# Patient Record
Sex: FEMALE | Race: WHITE | NOT HISPANIC OR LATINO | Employment: FULL TIME | URBAN - METROPOLITAN AREA
[De-identification: names, ages, dates, MRNs, and addresses within clinical notes are randomized per-mention and may not be internally consistent; named-entity substitution may affect disease eponyms.]

---

## 2023-10-20 ENCOUNTER — LACTATION CONSULT (OUTPATIENT)
Dept: LACTATION | Facility: CLINIC | Age: 23
End: 2023-10-20
Payer: MEDICAID

## 2023-10-20 DIAGNOSIS — O92.70 LACTATION PROBLEM (HHS-HCC): Primary | ICD-10-CM

## 2023-10-20 PROCEDURE — 99211 OFF/OP EST MAY X REQ PHY/QHP: CPT | Performed by: EMERGENCY MEDICINE

## 2023-10-20 NOTE — PROGRESS NOTES
Lactation Counseling Note    Subjective:    Darlene Moser is a 22 y.o. patient referred for lactation counseling. She is here today due to Latch issues. She was referred by her  Midwifw-Catia Lockwood .     OB HISTORY:   Mode of Delivery: Normal vaginal route    Objective:    BREASTFEEDING ASSESSMENT:   Breast Assessment: Large, Pendulous, and Compressible  Nipple Assessment/Stage: erect with stimulation, creased after feeding, and sore Stage II - Abrasions, shallow crack or fissure, stripe  Areola: Normal  Feeding Position: c - hold, cross - cradle, skin to skin, both sides, switch nursing, mother needs assistance with latch/positioning, misalignment of baby's head, trunk, and hips, and baby's head too high over breast  Latch: moderate assistance is needed, instructed on deep latch, cries while latching, latch achieved, sucking and swallowing, upper lip turned in, lower lip turned in, chin moves in rhythmic motion, and frequent audible swallows  Suck/Feeding: sustained, coordinated suck/swallow/breathe, audible swallowing, audible swallowing with stimulation, and nipple shield used  Alternative Feeding Methods: nursing at the breast and paced bottle  Feeding and Cleaning instructions reviewed for: Paced bottle  Infant Feeding Method: Breast milk only  Infant Behavior: awake, crying, fussy, readiness to feed, feeding cues observed, rooting response, and suckles on and off, needs stimulation  Infant Information: Ankyloglossia  Good cupping of tongue  Fontanel: flat  Mucous Membranes: moist  Breast Pain: Pain scale 0-10 (10 most pain): 0  Nipple Pain: Pain scale 0-10 (10 most pain): 3  Weight: Reported pediatrician visit weight: 7 lb. 0 oz.   Date of pediatrician weight: 10-16-23  Weight before feedinlb 6.6 oz.   Weight after feedin lb 8.4 oz.  gm  Amount of breast milk transferred: 1.8 oz.  ml  Number of voids in the last 24 hours: 6-8  Number of stools in the last 24 hours: 3-5    PATIENT DISCUSSION SUMMARY:  "Mom, Dad and baby here for initial visit. Recommended by midwife. Infant has been spitting up, fussy, \"eats constantly\" , \"can't put baby down\". Mom states latch is uncomfortable and she is using 20mm nipple shield to latch. Infant weighed prior to feed at 7 lb 6.6 oz. Which is a gain of 6.6 oz since peds visit 10-16. Oral anatomy assessed. Infant has easily visble lip restriction. Tongue needed to be lifted up and back to reveal posterior frenulum. These findings were shown to parents and options discussed. Recommended speaking with pediatrician regarding difficulties and possible referral to ENT or peds dentist. Assisted with application of 24mm nipple shield. Shown cross cradle hold and nipple to nose approach. Mom states no one has shown her application of shield or positioning/latch on techniques. Infant latched readily. Assisted to flange both upper and lower lips. Good rhythmic sucks with audible swallows. Milk in shield when released. Infant transferred 0.8 oz per test weight. Assisted to opposite side with shield. Mom able to place shield with minimal assistance. [place] nursed in similar fashion. Transferred an additional 0.4 oz. Infant then latched again to both breast using switch nursing. Infant transferred an additional 0.6 oz. For a total of 1.8 oz. Infant does need much breast massage and stimulation to keep sucking effectively at breast. Discussed beginning to give 1-2 12ml. Syringes of expressed milk after each feed and to pump. Mom to keep log of feedings. Plan is to return Monday 10-23 for additional assistance and weight check. Parents plan to research info. on tongue and lip ties and options available.     LACTATION PROBLEMS AND STRATEGIES:  Fussy baby: Carry baby with his/her tummy down across forearm with hand supporting chest  Check the baby for tight clothing  Frequent burping during feedings may help if baby seem to have gas  Give the baby a gentle message  Increase holding and carrying of " "baby  Mom and baby may take a warm bath together   Normal fussy period, usually during late afternoon or early evening  Nurse baby at least 10-12 times per day. Watch for feeding cues. Do not wait until baby cries to feed  Nurse on demand. Try to avoid too rigid of a schedule  Provide lots of time skin to skin with baby  Reduce environmental stimuli (loud sounds, bright lights, etc.)  Swaddle in sleep sack  Turn dryer, vacuum, or shower on for \"white\" noise  Problems latching baby to breast: Baby should latch to areola (dark area) not just the nipple.  Baby's lips should be flanged outward.  Bring the baby up to the level of your breast by putting a pillow under the baby.  Do not use bottles or pacifiers until latching on well.  Dribble milk over the nipple or express milk so that baby can taste it  Encourage a deeper latch with the asymetrical latch- lining baby's nose opposite mother's nipple.  Encourage nipple to stand up prior to feeing by pumping, nipple rolling or by brief application of ice.  Gently tickle your baby's lip with your nipple to encourage your baby to open his/her mouth wide.  Hold baby so that your breast is positioned deep in the baby's mouth.  Hold your baby close, tummy to tummy.  If baby does not latch to breast, express breast milk.  If engorged, express some milk to soften breast.  If the baby is not latched on well, break seal and gently try again.  Keep baby skin to skin and watch for feeding cues.  Massage breast to start milk-ejection reflex.  Place nipple and at least 1 inch of areola into baby's mouth.  Place your hand behind the baby's neck and shoulder.  Do not force baby's head into breast.  Put baby in the football hold or clutch hold, supporting his/her neck and head in sniffing position to open his/her throat.  Shape breast into oval shape (sandwich hold)  for deep latch.  Try different feeding positions (cradle, football, side etc.).  Use a breast feeding pillow to bring baby " up to the level of the breast.  Use nipple shield and monitor baby's weight gain and output.  Use semi-reclined feeding position to allow baby to take an active role and trigger baby's inborn feeding behavior.  Use your hand to support your breast during feeding.  When your baby's mouth is wide open, quickly pull baby into your breast.  Your baby's chin should be pressed into your breast.  Spitting up: Count wet diapers and stools to ensure baby is getting enough to eat, Eliminate supplements, Gentle handling of baby. Keep baby upright after feedings, If projectile vomiting between 2 and 8 weeks of age, call your physician, If the baby gulps and chokes after letdown, take the baby off of the breast until initial flow subsides, In an otherwise healthy baby, spitting up is a laundry problem, Limit to 1 breast per feeding if spitting up is due to too much milk at each feeding, and Temporarily stop vitamin, iron, or fluoride supplements  PATIENT INSTRUCTION HANDOUTS GIVEN:      LACTATION EDUCATION:  Correct Positioning and Correct Latch On

## 2023-10-20 NOTE — PATIENT INSTRUCTIONS
"Mom, Dad and baby here for initial visit. Recommended by midwife. Infant has been spitting up, fussy, \"eats constantly\" , \"can't put baby down\". Mom states latch is uncomfortable and she is using 20mm nipple shield to latch. Infant weighed prior to feed at 7 lb 6.6 oz. Which is a gain of 6.6 oz since peds visit 10-16. Oral anatomy assessed. Infant has easily visble lip restriction. Tongue needed to be lifted up and back to reveal posterior frenulum. These findings were shown to parents and options discussed. Recommended speaking with pediatrician regarding difficulties and possible referral to ENT or peds dentist. Assisted with application of 24mm nipple shield. Shown cross cradle hold and nipple to nose approach. Mom states no one has shown her application of shield or positioning/latch on techniques. Infant latched readily. Assisted to flange both upper and lower lips. Good rhythmic sucks with audible swallows. Milk in shield when released. Infant transferred 0.8 oz per test weight. Assisted to opposite side with shield. Mom able to place shield with minimal assistance. [place] nursed in similar fashion. Transferred an additional 0.4 oz. Infant then latched again to both breast using switch nursing. Infant transferred an additional 0.6 oz. For a total of 1.8 oz. Infant does need much breast massage and stimulation to keep sucking effectively at breast. Discussed beginning to give 1-2 12ml. Syringes of expressed milk after each feed and to pump. Mom to keep log of feedings. Plan is to return Monday 10-23 for additional assistance and weight check. Parents plan to research info. on tongue and lip ties and options available.      "

## 2023-10-22 PROBLEM — F41.9 ANXIETY: Status: ACTIVE | Noted: 2022-05-20

## 2023-10-22 PROBLEM — F32.A DEPRESSION: Status: ACTIVE | Noted: 2022-05-16

## 2023-10-22 NOTE — PROGRESS NOTES
"Plan    Advised to call office for breast complaints, abnormal bleeding, mood changes, or other concerning symptoms.   Cleared to resume normal activity as desired  ***    {Assess/PlanSmartLinks:34845}    Lindseymarry Perez, APRN-CNM, APRN-CNP    Subjective   22 y.o. No obstetric history on file. presenting for postpartum follow-up     Delivery Date: ***  Gestational Age: 39.1 wga  Type of Delivery: NSVB, initial trial of homebirth, labor stalled; presented to [facility] at 8/100[facility]0          Concerns: ***    Pain: controlled  Lacerations: {Laceration:25412}  Lochia: ***  Sexual Intimacy: {YES (DEF)/NO:70357}  Contraceptive Method: {LZCONTRACEPTION:38767}  Feeding Method: {Breast feeding, yes/no:66493} {complications; breast feeding ob:[id number]::\"no breast or nursing problems\"}  Lactation Consult Needed?: {YES (DEF)/NO:56072}    Birth Trauma: {YES-DESCRIBE/NO:49216}  Bonding with Baby: well with {baby gender:19250}, [unfilled]   Mood:  , h/o anxiety/depression, on seroquel and zoloft    Last pap: ***    Physical Exam   "

## 2023-10-23 ENCOUNTER — LACTATION CONSULT (OUTPATIENT)
Dept: LACTATION | Facility: CLINIC | Age: 23
End: 2023-10-23
Payer: MEDICAID

## 2023-10-23 PROCEDURE — 99211 OFF/OP EST MAY X REQ PHY/QHP: CPT | Performed by: EMERGENCY MEDICINE

## 2023-10-23 NOTE — PATIENT INSTRUCTIONS
Mom here for follow up weight check.  Mom has been feeding with a shield with has improved pumping and pc bottle of ebm.  Baby is up 2.6 oz in 3 days.  Mom has appt with gia bañuelos on thurday.  Mom to stay with plan until revision . Follow up on friday

## 2023-10-23 NOTE — PROGRESS NOTES
Lactation Counseling Note    Subjective:    Darlene Moser is a 22 y.o. patient referred for lactation counseling. She is here today due to Latch issues. She was referred by her  self .     OB HISTORY:       Objective:    BREASTFEEDING ASSESSMENT:   Breast Assessment: Medium, Full, and Soft  Nipple Assessment/Stage: intact, bruised, and erect Stage I - Pain or irritation with no skin breakdown  Areola: Normal  Feeding Position: baby - led, cradle, laid back, both sides, switch nursing, and mother demonstrates good positioning  Latch: moderate assistance is needed and deep latch obtained  Suck/Feeding: unsustained, audible swallowing, and nipple shield used  Alternative Feeding Methods: feeding expressed breast milk and syringe feeding  Infant Information: Ankyloglossia  Receding chin  Poor occlusion of lips around areola  Breast Pain: Pain scale 0-10 (10 most pain): 3  Weight: Weight before feedinlbs 9.2oz  Weight after feedinlbs10.9oz  Amount of breast milk transferred: 1.7oz ml  Number of voids in the last 24 hours: 8  Number of stools in the last 24 hours: 2-3    PATIENT DISCUSSION SUMMARY:  Mom here for follow up weight check.  Mom has been feeding with a shield with has improved pumping and pc bottle of ebm.  Baby is up 2.6 oz in 3 days.  Mom has appt with gia bañuelos on .  Mom to stay with plan until revision . Follow up on friday  LACTATION PROBLEMS AND STRATEGIES:  Problems latching baby to breast: Baby should latch to areola (dark area) not just the nipple.  Baby's lips should be flanged outward.  Bring the baby up to the level of your breast by putting a pillow under the baby.  Do not use bottles or pacifiers until latching on well.  Dribble milk over the nipple or express milk so that baby can taste it  Encourage a deeper latch with the asymetrical latch- lining baby's nose opposite mother's nipple.  Encourage nipple to stand up prior to feeing by pumping, nipple rolling or by brief  application of ice.  Gently tickle your baby's lip with your nipple to encourage your baby to open his/her mouth wide.  Hold baby so that your breast is positioned deep in the baby's mouth.  Hold your baby close, tummy to tummy.  If baby does not latch to breast, express breast milk.  If engorged, express some milk to soften breast.  If the baby is not latched on well, break seal and gently try again.  Keep baby skin to skin and watch for feeding cues.  Massage breast to start milk-ejection reflex.  Place nipple and at least 1 inch of areola into baby's mouth.  Place your hand behind the baby's neck and shoulder.  Do not force baby's head into breast.  Put baby in the football hold or clutch hold, supporting his/her neck and head in sniffing position to open his/her throat.  Shape breast into oval shape (sandwich hold)  for deep latch.  Try different feeding positions (cradle, football, side etc.).  Use a breast feeding pillow to bring baby up to the level of the breast.  Use nipple shield and monitor baby's weight gain and output.  Use semi-reclined feeding position to allow baby to take an active role and trigger baby's inborn feeding behavior.  Use your hand to support your breast during feeding.  When your baby's mouth is wide open, quickly pull baby into your breast.  Your baby's chin should be pressed into your breast.  PATIENT INSTRUCTION HANDOUTS GIVEN:     LACTATION EDUCATION:  Waking Techniques, Correct Positioning, and Correct Latch On

## 2023-10-25 PROBLEM — L30.9 ECZEMA: Status: ACTIVE | Noted: 2022-01-19

## 2023-10-25 PROBLEM — R55 SYNCOPE: Status: ACTIVE | Noted: 2023-10-25

## 2023-10-25 RX ORDER — ONDANSETRON 4 MG/1
1 TABLET, FILM COATED ORAL EVERY 8 HOURS PRN
COMMUNITY
Start: 2023-04-07 | End: 2023-11-09 | Stop reason: ALTCHOICE

## 2023-10-25 RX ORDER — QUETIAPINE FUMARATE 25 MG/1
50 TABLET, FILM COATED ORAL
COMMUNITY
Start: 2023-05-18 | End: 2023-11-09 | Stop reason: ALTCHOICE

## 2023-10-25 RX ORDER — HYDROCORTISONE ACETATE 25 MG/1
25 SUPPOSITORY RECTAL AS NEEDED
COMMUNITY
Start: 2023-09-14

## 2023-10-25 RX ORDER — FLUTICASONE PROPIONATE 50 MCG
1-2 SPRAY, SUSPENSION (ML) NASAL DAILY PRN
COMMUNITY
Start: 2023-07-10

## 2023-10-25 RX ORDER — MULTIVITAMIN
1 TABLET ORAL DAILY
COMMUNITY

## 2023-10-25 RX ORDER — BIOTIN 1 MG
1 TABLET ORAL DAILY
COMMUNITY

## 2023-10-25 RX ORDER — PLECANATIDE 3 MG/1
3 TABLET ORAL DAILY
COMMUNITY
Start: 2023-05-08

## 2023-10-25 RX ORDER — LINACLOTIDE 72 UG/1
72 CAPSULE, GELATIN COATED ORAL
COMMUNITY
Start: 2022-12-23 | End: 2023-11-09 | Stop reason: ALTCHOICE

## 2023-10-25 RX ORDER — FLUOXETINE HYDROCHLORIDE 20 MG/1
20 CAPSULE ORAL 2 TIMES DAILY
COMMUNITY
Start: 2022-10-31 | End: 2023-11-09 | Stop reason: WASHOUT

## 2023-10-25 RX ORDER — SERTRALINE HYDROCHLORIDE 50 MG/1
50 TABLET, FILM COATED ORAL DAILY
COMMUNITY
Start: 2023-06-27

## 2023-10-25 RX ORDER — FAMOTIDINE 20 MG/1
20 TABLET, FILM COATED ORAL 2 TIMES DAILY PRN
COMMUNITY
Start: 2023-05-25 | End: 2023-11-09 | Stop reason: ALTCHOICE

## 2023-10-25 RX ORDER — ALBUTEROL SULFATE 90 UG/1
2 AEROSOL, METERED RESPIRATORY (INHALATION) EVERY 4 HOURS PRN
COMMUNITY
Start: 2023-07-05 | End: 2023-11-09 | Stop reason: ALTCHOICE

## 2023-10-25 RX ORDER — CLOTRIMAZOLE AND BETAMETHASONE DIPROPIONATE 10; .5 MG/ML; MG/ML
LOTION TOPICAL 2 TIMES DAILY
COMMUNITY
Start: 2023-01-09

## 2023-10-25 RX ORDER — DICYCLOMINE HYDROCHLORIDE 20 MG/1
20 TABLET ORAL EVERY 6 HOURS PRN
COMMUNITY
Start: 2021-06-24

## 2023-10-26 ENCOUNTER — APPOINTMENT (OUTPATIENT)
Dept: OBSTETRICS AND GYNECOLOGY | Facility: CLINIC | Age: 23
End: 2023-10-26
Payer: MEDICAID

## 2023-10-27 ENCOUNTER — APPOINTMENT (OUTPATIENT)
Dept: LACTATION | Facility: CLINIC | Age: 23
End: 2023-10-27
Payer: MEDICAID

## 2023-10-27 ENCOUNTER — LACTATION CONSULT (OUTPATIENT)
Dept: LACTATION | Facility: CLINIC | Age: 23
End: 2023-10-27
Payer: MEDICAID

## 2023-10-27 DIAGNOSIS — O92.70 LACTATION PROBLEM (HHS-HCC): Primary | ICD-10-CM

## 2023-10-27 PROCEDURE — 99211 OFF/OP EST MAY X REQ PHY/QHP: CPT | Performed by: EMERGENCY MEDICINE

## 2023-10-27 NOTE — PATIENT INSTRUCTIONS
"Mom and baby Means here for follow up. [facility]. Mom states feeding has been challenging and she has not been able to latch infant even with the shield. Infant has been receiving expressed breast milk via bottle. Mom has been pumping, but states only obtaining small amounts, 1/2 oz. Infant weighed prior to feed at 7 lbs. 15.0 oz. Which is a gain of 5.8 oz in 4 days. Mom feeling better with weight gain. Assisted to breast without shield to see if deep latch could be obtained. Breasts feel full and milk expresses easily. Assisted with nipple to nose approach on left, least sore side to obtain deep latch. Infant took several attempts, but then latched well. Shown and assisted with flange of upper lip. Infant with good rhythmic long sucks and audible swallows. Nipple round when released. Infant reweighed after one side and transferred 1.1 oz. Mom very pleased with good transfer. Assisted to right breast. Infant has more difficulty on this side which may be due to discomfort when turning to his left. Discussed possible OT or chiropractor to assist with \"tightness.\" Infant took longer to latch deeply on this side, crying when latching and needing to be calmed first. Infant then latched well with sustained sucks and swallows. Mom using breast massage/compression well to keep sucking effective for as long as possible. Mom states latch comfort is a 2 on both breasts, but states \"I'm already sore.\" Nipple round when released. Discussed how as long as infant continues to latch more deeply and nipple is round when released, nipple soreness should then resolve. Infant weighed after second breast and transferred an additional 1.1 oz. Total for feed was 2.2 oz. Which is a good feed for size and age of baby. Infant had very small amount of spit up after feed. Infant content after feed. Feeding plan going forward is to attempt to latch without shield first, but " use shield if needed. If infant nurses well and is contented afterwards then additional supplement not needed. If infant does not nurse well then offer 15-30 mls expressed milk supplement. If no feed at breast at all then give 2-2.5 oz. EBM via bottle. Mom to return Monday 10-30-23 for further follow up and assistance.

## 2023-10-27 NOTE — PROGRESS NOTES
Lactation Counseling Note    Subjective:    Darlene Moser is a 22 y.o. patient referred for lactation counseling. She is here today due to Latch issues. She was referred by her  midwife Catia Lockwood .     OB HISTORY:   Mode of Delivery: Normal vaginal route    Objective:    BREASTFEEDING ASSESSMENT:   Breast Assessment: Large, Pendulous, Full, Compressible, and Readiness to feed  Nipple Assessment/Stage: intact, erect with stimulation, rounded after feeding, and sore Stage I - Pain or irritation with no skin breakdown  Areola: Normal  Feeding Position: baby - led, breast sandwich, c - hold, cross - cradle, laid back, skin to skin, both sides, nipple to nose, mother needs assistance with latch/positioning, nose or chin not touching breast, and misalignment of baby's head, trunk, and hips  Latch: moderate assistance is needed, instructed on deep latch, eagerly grasped on to latch, cries while latching, deep latch obtained, sucking and swallowing, sucks with long jaw movement, chin and lower lip contact breast first, bursts of sucking, swallowing, and rest, upper lip turned in, chin moves in rhythmic motion, and frequent audible swallows  Suck/Feeding: sustained, coordinated suck/swallow/breathe, tactile stimulation needed, baby led rhythmically, audible swallowing, audible swallowing with stimulation, and content after feeding  Alternative Feeding Methods: nursing at the breast, feeding expressed breast milk, and paced bottle  Feeding and Cleaning instructions reviewed for: Paced bottle  Infant Feeding Method: Breast milk only  Infant Behavior: awake, quiet alert, readiness to feed, feeding cues observed, rooting response, and content after feeding  Infant Information: Post status frenotomy  Receding chin  Good cupping of tongue  Good lateral movement of the tongue  Able to elevate tongue to roof of mouth  Fontanel: flat  Mucous Membranes: moist  Breast Pain: Pain scale 0-10 (10 most pain): 0  Nipple Pain: Pain scale  "0-10 (10 most pain): 2  Pain description: sore  After adjustment pain 0-10 (10 most pain): 2  Other pain relief methods: deeper off center latch; positioning  Weight: Weight before feedin lb. 15.0 oz  Weight after feedin lb 1.2 oz.  Amount of breast milk transferred: 2.2 ml  Number of voids in the last 24 hours: 6-8  Number of stools in the last 24 hours: 4-5    PATIENT DISCUSSION SUMMARY:   Mom and baby [place] here for follow up. Infant had both lip and tongue laser release done per [facility] yesterday. Mom states feeding has been challenging and she has not been able to latch infant even with the shield. Infant has been receiving expressed breast milk via bottle. Mom has been pumping, but states only obtaining small amounts, 1/2 oz. Infant weighed prior to feed at 7 lbs. 15.0 oz. Which is a gain of 5.8 oz in 4 days. Mom feeling better with weight gain. Assisted to breast without shield to see if deep latch could be obtained. Breasts feel full and milk expresses easily. Assisted with nipple to nose approach on left, least sore side to obtain deep latch. Infant took several attempts, but then latched well. Shown and assisted with flange of upper lip. Infant with good rhythmic long sucks and audible swallows. Nipple round when released. Infant reweighed after one side and transferred 1.1 oz. Mom very pleased with good transfer. Assisted to right breast. Infant has more difficulty on this side which may be due to discomfort when turning to his left. Discussed possible OT or chiropractor to assist with \"tightness.\" Infant took longer to latch deeply on this side, crying when latching and needing to be calmed first. Infant then latched well with sustained sucks and swallows. Mom using breast massage/compression well to keep sucking effective for as long as possible. Mom states latch comfort is a 2 on both breasts, but states \"I'm already sore.\" Nipple round when released. Discussed how as long as infant " continues to latch more deeply and nipple is round when released, nipple soreness should then resolve. Infant weighed after second breast and transferred an additional 1.1 oz. Total for feed was 2.2 oz. Which is a good feed for size and age of baby. Infant had very small amount of spit up after feed. Infant content after feed. Feeding plan going forward is to attempt to latch without shield first, but use shield if needed. If infant nurses well and is contented afterwards then additional supplement not needed. If infant does not nurse well then offer 15-30 mls expressed milk supplement. If no feed at breast at all then give 2-2.5 oz. EBM via bottle. Mom to return Monday 10-30-23 for further follow up and assistance.     LACTATION PROBLEMS AND STRATEGIES:  Problems latching baby to breast: Baby should latch to areola (dark area) not just the nipple.  Baby's lips should be flanged outward.  Bring the baby up to the level of your breast by putting a pillow under the baby.  Do not use bottles or pacifiers until latching on well.  Dribble milk over the nipple or express milk so that baby can taste it  Encourage a deeper latch with the asymetrical latch- lining baby's nose opposite mother's nipple.  Encourage nipple to stand up prior to feeing by pumping, nipple rolling or by brief application of ice.  Gently tickle your baby's lip with your nipple to encourage your baby to open his/her mouth wide.  Hold baby so that your breast is positioned deep in the baby's mouth.  Hold your baby close, tummy to tummy.  If baby does not latch to breast, express breast milk.  If engorged, express some milk to soften breast.  If the baby is not latched on well, break seal and gently try again.  Keep baby skin to skin and watch for feeding cues.  Massage breast to start milk-ejection reflex.  Place nipple and at least 1 inch of areola into baby's mouth.  Place your hand behind the baby's neck and shoulder.  Do not force baby's head into  breast.  Put baby in the football hold or clutch hold, supporting his/her neck and head in sniffing position to open his/her throat.  Shape breast into oval shape (sandwich hold)  for deep latch.  Try different feeding positions (cradle, football, side etc.).  Use a breast feeding pillow to bring baby up to the level of the breast.  Use nipple shield and monitor baby's weight gain and output.  Use semi-reclined feeding position to allow baby to take an active role and trigger baby's inborn feeding behavior.  Use your hand to support your breast during feeding.  When your baby's mouth is wide open, quickly pull baby into your breast.  Your baby's chin should be pressed into your breast.  PATIENT INSTRUCTION HANDOUTS GIVEN:      LACTATION EDUCATION:  Correct Positioning and Correct Latch On

## 2023-10-30 ENCOUNTER — LACTATION CONSULT (OUTPATIENT)
Dept: LACTATION | Facility: CLINIC | Age: 23
End: 2023-10-30
Payer: MEDICAID

## 2023-10-30 NOTE — PROGRESS NOTES
"Lactation Counseling Note    Subjective:    Darlene Moser is a 23 y.o. patient referred for lactation counseling. She is here today due to Latch issues. She was referred by her  self .     OB HISTORY:   Mode of Delivery: Normal vaginal route    Objective:    BREASTFEEDING ASSESSMENT:   Breast full nipples sore but intact  bay led latch mom needs minimal assistance good swallows     PATIENT DISCUSSION SUMMARY:  Mom here for f/u wt check  baby up 3 oz in 3 days.  Mom states nursing better but still having pain.  Assisted with positioning and deeper latch and then mom able to repeat on other breast.  Mom states much improved baby transferred about 2 oz and did want any more mom thinks this plan will work wants to f/u on wed nov 1  LACTATION PROBLEMS AND STRATEGIES:  Fussy baby: Carry baby with his/her tummy down across forearm with hand supporting chest  Check the baby for tight clothing  Frequent burping during feedings may help if baby seem to have gas  Give the baby a gentle message  Increase holding and carrying of baby  Mom and baby may take a warm bath together   Normal fussy period, usually during late afternoon or early evening  Nurse baby at least 10-12 times per day. Watch for feeding cues. Do not wait until baby cries to feed  Nurse on demand. Try to avoid too rigid of a schedule  Provide lots of time skin to skin with baby  Reduce environmental stimuli (loud sounds, bright lights, etc.)  Swaddle in sleep sack  Turn dryer, vacuum, or shower on for \"white\" noise  PATIENT INSTRUCTION HANDOUTS GIVEN:   Breastfeeding and Tongue-tie  LACTATION EDUCATION:  Correct Positioning and Correct Latch On                                                                                         "

## 2023-10-30 NOTE — PATIENT INSTRUCTIONS
Mom here for f/u wt check  baby up 3 oz in 3 days.  Mom states nursing better but still having pain.  Assisted with positioning and deeper latch and then mom able to repeat on other breast.  Mo{}m states much improved baby transferred about 2 ozand did want any more mom thinks this plan will work wants to f/u on wed nov 1...

## 2023-11-01 ENCOUNTER — LACTATION CONSULT (OUTPATIENT)
Dept: LACTATION | Facility: CLINIC | Age: 23
End: 2023-11-01
Payer: MEDICAID

## 2023-11-01 PROCEDURE — 99211 OFF/OP EST MAY X REQ PHY/QHP: CPT | Mod: 25 | Performed by: EMERGENCY MEDICINE

## 2023-11-01 NOTE — PATIENT INSTRUCTIONS
Pt returned for weight check and sore nipples. Observed latch baby nursing with lower lip tucked in .  Instructed how release lower lip by pulling down her chin.  Pt states no pain.  Discussed cluster feeding at 6 weeks baby transferred well more than ever.  Baby with good weight gain. Follow up on friday

## 2023-11-01 NOTE — PROGRESS NOTES
Lactation Counseling Note    Subjective:    Darlene Moser is a 23 y.o. patient referred for lactation counseling. She is here today due to  wt check . She was referred by her  self .     OB HISTORY:   Mode of Delivery: Normal vaginal route    Objective:    BREASTFEEDING ASSESSMENT:   Breast Assessment: Large, Full, Soft, Warm, and Compressible  Feeding Position: cradle, laid back, skin to skin, both sides, nipple to nose, and mother demonstrates good positioning  Latch: minimal assistance is needed and latch achieved  Suck/Feeding: sustained and audible swallowing  Infant Behavior: quiet alert, readiness to feed, feeding cues observed, and content after feeding  Breast Pain: Pain scale 0-10 (10 most pain): 2  After adjustment pain 0-10 (10 most pain): 0  Weight: Weight before feedinlbs 5.2  Weight after feedinlbs 8.0oz  Amount of breast milk transferred: 2.8oz ml    PATIENT DISCUSSION SUMMARY:  Pt returned for weight check and sore nipples. Observed latch baby nursing with lower lip tucked in .  Instructed how release lower lip by pulling down her chin.  Pt states no pain.  Discussed cluster feeding at 6 weeks baby transferred well more than ever.  Baby with good weight gain. Follow up on friday  LACTATION PROBLEMS AND STRATEGIES:     PATIENT INSTRUCTION HANDOUTS GIVEN:      LACTATION EDUCATION:  Correct Positioning

## 2023-11-03 ENCOUNTER — LACTATION CONSULT (OUTPATIENT)
Dept: LACTATION | Facility: CLINIC | Age: 23
End: 2023-11-03
Payer: MEDICAID

## 2023-11-03 NOTE — LACTATION NOTE
Mom here with 6 week old baby for follow up s/p frenotomy. Baby's weight gain is WNL and tx 3.8oz at breast. Mom still c/o pain with feeds and clicking noted. Showed hoe to use breast compression, breast sandwich and keep baby pulled in closer. Recommend chiropracter, a few names/options provided. Offered support and encouragement. Continue breatsfeeding on demand, follow up next week.

## 2023-11-06 ENCOUNTER — LACTATION CONSULT (OUTPATIENT)
Dept: LACTATION | Facility: CLINIC | Age: 23
End: 2023-11-06
Payer: MEDICAID

## 2023-11-06 NOTE — PATIENT INSTRUCTIONS
Mom here because baby is causing 8/10 discomfort.  Mom asked to check the revision for reattachment.  Noted tight frenulun and while doing stretches revision line opened .  Minuscule amt of bloody saliva noted.  Baby latched rated pain at 0 baby quickly transferred 4 oz.  Rev and mom demo'd stretches . She wasn't deep enough.  Mom voiced understanding

## 2023-11-06 NOTE — PROGRESS NOTES
Lactation Counseling Note    Subjective:    Darlene Moser is a 23 y.o. patient referred for lactation counseling. She is here today due to Sore/cracked nipple(s) and Latch issues. She was referred by her  self .     OB HISTORY:   Mode of Delivery: Normal vaginal route    Objective:    BREASTFEEDING ASSESSMENT:   Breast Assessment: Medium  Nipple Assessment/Stage: intact, bruised, and erect Stage I - Pain or irritation with no skin breakdown  Areola: Normal  Latch: instructed on deep latch, eagerly grasped on to latch, and deep latch obtained  Alternative Feeding Methods: nursing at the breast  Nipple Pain: Pain scale 0-10 (10 most pain): 8  Weight: Weight before feedinlbs 8oz  Weight after feedinlbs 12oz  Amount of breast milk transferred: 4oz ml    PATIENT DISCUSSION SUMMARY:  Mom here because baby is causing 8/10 discomfort.  Mom asked to check the revision for reattachment.  Noted tight frenulun and while doing stretches revision line opened .  Minuscule amt of bloody saliva noted.  Baby latched rated pain at 0 baby quickly transferred 4 oz.  Rev and mom demo'd stretches . She wasn't deep enough.  Mom voiced understanding.  LACTATION PROBLEMS AND STRATEGIES:  Of frenotomy   PATIENT INSTRUCTION HANDOUTS GIVEN:      LACTATION EDUCATION:  Correct Positioning, Correct Latch On, and post frenotomy stretches

## 2023-11-08 ENCOUNTER — LACTATION CONSULT (OUTPATIENT)
Dept: LACTATION | Facility: CLINIC | Age: 23
End: 2023-11-08
Payer: MEDICAID

## 2023-11-08 NOTE — PROGRESS NOTES
Lactation Counseling Note    Subjective:    Darlene Moser is a 23 y.o. patient referred for lactation counseling. She is here today due to  weight check . She was referred by her  self .     OB HISTORY:   Mode of Delivery: Normal vaginal route    Objective:    BREASTFEEDING ASSESSMENT:   Breast Assessment: Medium, Full, and Soft  Nipple Assessment/Stage: intact, short, and sore Stage I - Pain or irritation with no skin breakdown  Areola: Normal  Feeding Position: cradle, cross - cradle, skin to skin, both sides, nipple to nose, and mother demonstrates good positioning  Latch: minimal assistance is needed, deep latch obtained, mouth open/moves head side to side, and comfortable with no pain  Infant Feeding Method: Breast milk only  Infant Behavior: awake, fussy, readiness to feed, feeding cues observed, and content after feeding  Infant Information: Post status frenotomy  Receding chin  Tongue protrudes over alveolar ridge  Able to elevate tongue to roof of mouth  Breast Pain: Pain scale 0-10 (10 most pain): 0  Weight: Weight before feedinlbs 10oz  Weight after feedinlbs 13.5  Amount of breast milk transferred: 3.5oz ml    PATIENT DISCUSSION SUMMARY:  Mom here for post frenotomy care and weight check.  Baby up2oz in 2 days.  Mom states she is doing better with stretches and denies any pain with feeding.  Baby transferred 3.5oz in a short period of time.  Mom wants to follow up next week.  LACTATION PROBLEMS AND STRATEGIES:     PATIENT INSTRUCTION HANDOUTS GIVEN:      LACTATION EDUCATION:  Waking Techniques, Correct Positioning, and Correct Latch On

## 2023-11-09 ENCOUNTER — POSTPARTUM VISIT (OUTPATIENT)
Dept: OBSTETRICS AND GYNECOLOGY | Facility: CLINIC | Age: 23
End: 2023-11-09
Payer: MEDICAID

## 2023-11-09 VITALS
HEIGHT: 62 IN | DIASTOLIC BLOOD PRESSURE: 60 MMHG | BODY MASS INDEX: 23.92 KG/M2 | SYSTOLIC BLOOD PRESSURE: 100 MMHG | WEIGHT: 130 LBS

## 2023-11-09 ASSESSMENT — EDINBURGH POSTNATAL DEPRESSION SCALE (EPDS)
THINGS HAVE BEEN GETTING ON TOP OF ME: NO, MOST OF THE TIME I HAVE COPED QUITE WELL
I HAVE BEEN SO UNHAPPY THAT I HAVE BEEN CRYING: ONLY OCCASIONALLY
I HAVE FELT SCARED OR PANICKY FOR NO GOOD REASON: NO, NOT AT ALL
I HAVE BEEN ANXIOUS OR WORRIED FOR NO GOOD REASON: NO, NOT AT ALL
I HAVE BEEN SO UNHAPPY THAT I HAVE HAD DIFFICULTY SLEEPING: NOT AT ALL
TOTAL SCORE: 5
I HAVE BLAMED MYSELF UNNECESSARILY WHEN THINGS WENT WRONG: NO, NEVER
I HAVE FELT SAD OR MISERABLE: NOT VERY OFTEN
THE THOUGHT OF HARMING MYSELF HAS OCCURRED TO ME: HARDLY EVER
I HAVE LOOKED FORWARD WITH ENJOYMENT TO THINGS: RATHER LESS THAN I USED TO
I HAVE BEEN ABLE TO LAUGH AND SEE THE FUNNY SIDE OF THINGS: AS MUCH AS I ALWAYS COULD

## 2023-11-09 ASSESSMENT — PAIN SCALES - GENERAL: PAINLEVEL: 0-NO PAIN

## 2023-11-09 NOTE — PROGRESS NOTES
"Plan    Advised to call office for breast complaints, abnormal bleeding, mood changes, or other concerning symptoms.   Cleared to resume normal activity as desired  Discussed babywearing for soothing infant, babywearing group information given    Diagnoses and all orders for this visit:  Normal postpartum course    Lindsey DOWNING Pennock, APRN-CNM, APRN-CNP    Subjective   23 y.o.  presenting for postpartum follow-up.    Delivery Date: 23  Gestational Age: 38.2  Type of Delivery: NSVB    Pregnancy complicated by:  - Anxiety, and zoloft 50mg  - Anemia, PO iron  - Rubella non-immune    Concerns: denies concerns today    Pain: controlled  Lacerations: 2nd degree and labial  Lochia: menses last week  Sexual Intimacy: No  Contraceptive Method: None, declines today  Feeding Method: She is breast feeding exclusively. no breast or nursing problems  Lactation Consult Needed?: Working with lactation, baby s/p lip and tongue revision.    Birth Trauma: No, patient was intending to have home birth but ended up transferring to hospital. She reports transfer to hospital and overall experience was good.   Bonding with Baby: well with baby boy, [place]. She reports baby is frequently fussy.   Mood: stable, working with psychiatrist   Postpartum Depression: High Risk (2023)    [place]  Depression Scale     Last EPDS Total Score: 5     Last EPDS Self Harm Result: Hardly ever     Last pap: 2022 NILM    Objective    /60   Ht 1.575 m (5' 2\")   Wt 59 kg (130 lb)   LMP  (LMP Unknown)   Breastfeeding Yes   BMI 23.78 kg/m²      Physical Exam  Constitutional:       Appearance: Normal appearance.   Genitourinary:      Right Labia: No tenderness.     Left Labia: No tenderness.     No vaginal discharge, erythema, tenderness or bleeding.   Cardiovascular:      Rate and Rhythm: Regular rhythm.   Pulmonary:      Effort: Pulmonary effort is normal.   Abdominal:      Palpations: Abdomen is soft.      Tenderness: " There is no abdominal tenderness.   Neurological:      General: No focal deficit present.      Mental Status: She is alert and oriented to person, place, and time. Mental status is at baseline.   Skin:     General: Skin is warm and dry.   Psychiatric:         Mood and Affect: Mood normal.         Behavior: Behavior normal.         Thought Content: Thought content normal.         Judgment: Judgment normal.

## 2023-11-09 NOTE — PATIENT INSTRUCTIONS
Mom here for post frenotomy care and weight check.  Baby up2oz in 2 days.  Mom states she is doing better with stretches and denies any pain with feeding.  Baby transferred 3.5oz in a short period of time.  Mom wants to follow up next week.

## 2023-11-15 ENCOUNTER — LACTATION CONSULT (OUTPATIENT)
Dept: LACTATION | Facility: CLINIC | Age: 23
End: 2023-11-15
Payer: MEDICAID

## 2023-11-16 ENCOUNTER — APPOINTMENT (OUTPATIENT)
Dept: LACTATION | Facility: CLINIC | Age: 23
End: 2023-11-16
Payer: MEDICAID

## 2023-11-16 NOTE — PATIENT INSTRUCTIONS
Mom here for wt check baby up 2 oz in 2days.  Assessed tongue post frenotomy appears to be healing well.  Baby trans fered 2.6 oz appears satisfied.  Mom to continue with stretches and body work.

## 2023-11-16 NOTE — PROGRESS NOTES
Lactation Counseling Note    Subjective:    Darlene Moser is a 23 y.o. patient referred for lactation counseling. She is here today due to  weight check . She was referred by her  self .     OB HISTORY:   Mode of Delivery: Normal vaginal route    Objective:    BREASTFEEDING ASSESSMENT:   Breast Assessment: Medium, Symmetrical, Full, Soft, and Compressible  Nipple Assessment/Stage: intact and sore Stage I - Pain or irritation with no skin breakdown  Areola: Normal  Feeding Position: baby - led, cradle, cross - cradle, laid back, skin to skin, both sides, nipple to nose, and mother demonstrates good positioning  Latch: minimal assistance is needed, latch achieved, and comfortable with no pain  Suck/Feeding: sustained, audible swallowing, and content after feeding  Alternative Feeding Methods: nursing at the breast  Infant Feeding Method: Breast milk only  Infant Behavior: awake, active alert, fussy, readiness to feed, and content after feeding  Nipple Pain: Pain scale 0-10 (10 most pain): 1-2  Weight: Weight before feedinlb 2.3oz  Weight after feedinlbs 4.9oz  Amount of breast milk transferred: 2.6oz ml    PATIENT DISCUSSION SUMMARY:  Mom here for wt check baby up 2 oz in 2days.  Assessed tongue post frenotomy appears to be healing well.  Baby trans fered 2.6 oz appears satisfied.  Mom to continue with stretches and body work.  LACTATION PROBLEMS AND STRATEGIES:     PATIENT INSTRUCTION HANDOUTS GIVEN:      LACTATION EDUCATION:  Correct Positioning and Correct Latch On

## 2023-11-17 ENCOUNTER — APPOINTMENT (OUTPATIENT)
Dept: LACTATION | Facility: CLINIC | Age: 23
End: 2023-11-17
Payer: MEDICAID

## 2023-11-17 ENCOUNTER — LACTATION CONSULT (OUTPATIENT)
Dept: LACTATION | Facility: CLINIC | Age: 23
End: 2023-11-17
Payer: MEDICAID

## 2023-11-17 NOTE — PROGRESS NOTES
Lactation Counseling Note    Subjective:    Darlene Moser is a 23 y.o. patient referred for lactation counseling. She is here today due to Latch issues. She was referred by her  self .     OB HISTORY:   /Para: : 1  Para: 1    Objective:    BREASTFEEDING ASSESSMENT:   Breast Assessment: Medium, Large, Pendulous, Full, Compressible, and Readiness to feed  Nipple Assessment/Stage: intact, short, erect with stimulation, and rounded after feeding    Areola: Normal  Feeding Position: c - hold, cross - cradle, laid back, skin to skin, both sides, switch nursing, nipple to nose, and mother needs assistance with latch/positioning  Latch: minimal assistance is needed, instructed on deep latch, eagerly grasped on to latch, deep latch obtained, comfortable with no pain, sucking and swallowing, sucks with long jaw movement, chin and lower lip contact breast first, bursts of sucking, swallowing, and rest, upper lip turned in, chin moves in rhythmic motion, and frequent audible swallows  Suck/Feeding: sustained, coordinated suck/swallow/breathe, tactile stimulation needed, audible swallowing, audible swallowing with stimulation, content after feeding, and loss of suction sound at breast  Alternative Feeding Methods: nursing at the breast  Feeding and Cleaning instructions reviewed for:    Infant Feeding Method: Breast milk only  Infant Behavior: awake, quiet alert, readiness to feed, feeding cues observed, rooting response, and content after feeding  Infant Information: Post status frenotomy  Good cupping of tongue  Good lateral movement of the tongue  Able to elevate tongue to roof of mouth  Fontanel: flat  Mucous Membranes: moist  Weight: Weight before feedin lb. 3.3 oz.   Weight after feedin lb. 5.5 oz.   Amount of breast milk transferred: 2.2 oz.  ml    PATIENT DISCUSSION SUMMARY: Mom and baby here for continued weight check and latch assessment. Infant weighed prior to feed at 9 lb. 3.3 oz. Which is a  "gain of 1.0 oz. In 2 days. Mom independently latched infant. Noted loss of suction sound while at breast, assisted to obtain deeper latch, bringing chin down to get further onto breast. Infant had quiet rhythmic sucks and swallows. Reweighed after first side and transferred 1.3 oz. Per test weight. Infant latched in similar fashion to right breast. Needed assist to obtain deeper latch. Transferred an additional 0.6oz. per test weight. Completed and reviewed aftercare stretches. Lip healing appropriately. Deep stretch of tongue, no bleeding, appears to be healing appropriately as well. Infant still rooting, placed on \"third\" side for an additional 0.3 oz transfer. Totsal for feed = 2.2 oz. To return Monday 11-20-23 for continued monitoring.     LACTATION PROBLEMS AND STRATEGIES:  Problems latching baby to breast: Baby should latch to areola (dark area) not just the nipple.  Baby's lips should be flanged outward.  Bring the baby up to the level of your breast by putting a pillow under the baby.  Do not use bottles or pacifiers until latching on well.  Dribble milk over the nipple or express milk so that baby can taste it  Encourage a deeper latch with the asymetrical latch- lining baby's nose opposite mother's nipple.  Encourage nipple to stand up prior to feeing by pumping, nipple rolling or by brief application of ice.  Gently tickle your baby's lip with your nipple to encourage your baby to open his/her mouth wide.  Hold baby so that your breast is positioned deep in the baby's mouth.  Hold your baby close, tummy to tummy.  If baby does not latch to breast, express breast milk.  If engorged, express some milk to soften breast.  If the baby is not latched on well, break seal and gently try again.  Keep baby skin to skin and watch for feeding cues.  Massage breast to start milk-ejection reflex.  Place nipple and at least 1 inch of areola into baby's mouth.  Place your hand behind the baby's neck and shoulder.  Do not " force baby's head into breast.  Put baby in the football hold or clutch hold, supporting his/her neck and head in sniffing position to open his/her throat.  Shape breast into oval shape (sandwich hold)  for deep latch.  Try different feeding positions (cradle, football, side etc.).  Use a breast feeding pillow to bring baby up to the level of the breast.  Use nipple shield and monitor baby's weight gain and output.  Use semi-reclined feeding position to allow baby to take an active role and trigger baby's inborn feeding behavior.  Use your hand to support your breast during feeding.  When your baby's mouth is wide open, quickly pull baby into your breast.  Your baby's chin should be pressed into your breast.  PATIENT INSTRUCTION HANDOUTS GIVEN:      LACTATION EDUCATION:  Correct Positioning and Correct Latch On

## 2023-11-17 NOTE — PATIENT INSTRUCTIONS
"Mom and baby here for continued weight check and latch assessment. Infant weighed prior to feed at 9 lb. 3.3 oz. Which is a gain of 1.0 oz. In 2 days. Mom independently latched infant. Noted loss of suction sound while at breast, assisted to obtain deeper latch, bringing chin down to get further onto breast. Infant had quiet rhythmic sucks and swallows. Reweighed after first side and transferred 1.3 oz. Per test weight. Infant latched in similar fashion to right breast. Needed assist to obtain deeper latch. Transferred an additional 0.6oz. per test weight. Completed and reviewed aftercare stretches. Lip healing appropriately. Deep stretch of tongue, no bleeding, appears to be healing appropriately as well. Infant still rooting, placed on \"third\" side for an additional 0.3 oz transfer. Totsal for feed = 2.2 oz. To return Monday 11-20-23 for continued monitoring.    "

## 2023-11-20 ENCOUNTER — LACTATION CONSULT (OUTPATIENT)
Dept: LACTATION | Facility: CLINIC | Age: 23
End: 2023-11-20
Payer: MEDICAID

## 2023-11-20 PROCEDURE — 99211 OFF/OP EST MAY X REQ PHY/QHP: CPT | Performed by: EMERGENCY MEDICINE

## 2023-11-21 NOTE — PATIENT INSTRUCTIONS
Here for follow up weight check . Baby up 4.8oz in 3 days.  Mom states pain is much improved frenotomy wound healing nicely mom doing stretches well.  Observed a good feeding only mild pain with latch on l side  following up with chiro today . Follow up11/22

## 2023-11-21 NOTE — PROGRESS NOTES
Lactation Counseling Note    Subjective:    Darlene Moser is a 23 y.o. patient referred for lactation counseling. She is here today due to  weight check . She was referred by her  self .     OB HISTORY:   Mode of Delivery: Normal vaginal route    Objective:    BREASTFEEDING ASSESSMENT:   OBGyn Exam  Breast Assessment: Medium, Full, Soft, Warm, and Compressible  Nipple Assessment/Stage: intact, bruised, erect, and rounded after feeding Stage I - Pain or irritation with no skin breakdown  Areola: Normal  Feeding Position: cradle, laid back, skin to skin, both sides, nipple to nose, and mother demonstrates good positioning  Latch: deep latch obtained, latch is painful, sucking and swallowing, flanged lips, correct tongue position, and frequent audible swallows  Suck/Feeding: sustained and audible swallowing  Infant Behavior: awake, crying, readiness to feed, feeding cues observed, sucking, and content after feeding  Infant Information: Post status frenotomy  Good cupping of tongue  Good lateral movement of the tongue  Able to elevate tongue to roof of mouth  Nipple Pain: Pain scale 0-10 (10 most pain): 1  Weight: Weight before feedinlbs 8.1oz  Weight after feedinlbs 9.4oz  Amount of breast milk transferred: 1.3oz ml    PATIENT DISCUSSION SUMMARY:  Here for follow up weight check . Baby up 4.8oz in 3 days.  Mom states pain is much improved frenotomy wound healing nicely mom doing stretches well.  Observed a good feeding only mild pain with latch on l side  following up with chiro today . Follow up  LACTATION PROBLEMS AND STRATEGIES:     PATIENT INSTRUCTION HANDOUTS GIVEN:      LACTATION EDUCATION:  Correct Positioning and Correct Latch On

## 2023-11-22 ENCOUNTER — LACTATION CONSULT (OUTPATIENT)
Dept: LACTATION | Facility: CLINIC | Age: 23
End: 2023-11-22
Payer: MEDICAID

## 2023-11-22 PROCEDURE — 99211 OFF/OP EST MAY X REQ PHY/QHP: CPT | Mod: 25 | Performed by: EMERGENCY MEDICINE

## 2023-11-24 NOTE — PROGRESS NOTES
Lactation Counseling Note    Subjective:    Darlene Moser is a 23 y.o. patient referred for lactation counseling. She is here today due to  WEIGHT CHECK . She was referred by her  SELF .     OB HISTORY:   Mode of Delivery: Normal vaginal route    Objective:    BREASTFEEDING ASSESSMENT:   OBGyn Exam  Breast Assessment: Medium, Symmetrical, Full, Soft, Warm, and Compressible  Nipple Assessment/Stage: intact 0  Areola: Normal  Feeding Position: cradle, laid back, football/seated, skin to skin, both sides, nipple to nose, and mother demonstrates good positioning  Latch: latch achieved and wide open mouth < 160  Suck/Feeding: sustained, audible swallowing, and content after feeding  Infant Behavior: awake, crying, fussy, readiness to feed, feeding cues observed, rooting response, and content after feeding  Infant Information: Post status frenotomy  Tongue protrudes over alveolar ridge  Good cupping of tongue  Good lateral movement of the tongue  Able to elevate tongue to roof of mouth  Nipple Pain: Pain scale 0-10 (10 most pain): 0  Weight: Weight before feedinLBS 8.7OZ  Weight after feedinlbs 12.3oz  Amount of breast milk transferred: 3.6oz ml    PATIENT DISCUSSION SUMMARY:    LACTATION PROBLEMS AND STRATEGIES:     PATIENT INSTRUCTION HANDOUTS GIVEN:      LACTATION EDUCATION:  Correct Positioning and Correct Latch On

## 2023-11-27 ENCOUNTER — LACTATION CONSULT (OUTPATIENT)
Dept: LACTATION | Facility: CLINIC | Age: 23
End: 2023-11-27
Payer: MEDICAID

## 2023-11-27 PROCEDURE — 99211 OFF/OP EST MAY X REQ PHY/QHP: CPT | Performed by: EMERGENCY MEDICINE

## 2023-11-27 NOTE — PROGRESS NOTES
Lactation Counseling Note    Subjective:    Darlene Moser is a 23 y.o. patient referred for lactation counseling. She is here today due to Latch issues. She was referred by her  self .     OB HISTORY:   /Para: : 1  Para: 1    Objective:    BREASTFEEDING ASSESSMENT:   OBGyn Exam  Breast Assessment: Large, Pendulous, Full, Compressible, and Readiness to feed  Nipple Assessment/Stage: intact, erect, and rounded after feeding none  Areola: Normal  Feeding Position: breast sandwich, c - hold, cross - cradle, laid back, skin to skin, both sides, nipple to nose, and mother demonstrates good positioning  Latch: minimal assistance is needed, eagerly grasped on to latch, areolar attachment, comfortable with no pain, sucking and swallowing, sucks with long jaw movement, chin and lower lip contact breast first, bursts of sucking, swallowing, and rest, chin moves in rhythmic motion, comfortable latch, and frequent audible swallows  Suck/Feeding: sustained, coordinated suck/swallow/breathe, tactile stimulation needed, audible swallowing, audible swallowing with stimulation, and content after feeding  Weight: Weight before feedin lb. 13.2 oz.   Weight after feedin lb. 15.2 oz.   Amount of breast milk transferred: 2.0 oz. ml    PATIENT DISCUSSION SUMMARY: Mom and baby Cartwright here for weight check and latch assessment. Infant went to first chiro appt. Last week and is scheduled for second appt. This week. Mom states nursing is going well, but continues to make loss of suction sound when nursing and is difficult to settle and not have to be held. Infant weighed prior to feed at 9 lb. 13.2 oz. Which is a gain of 4.5 ounces in 5 days. Mom latched infant independently to first side. Adjusted latch and used laid back position, but loss of suction continued intermittently. Infant reweighed after first side and transferred 1.5 oz. Per test weight. Infant latched on second side, but was more distractable which Mom  states has been happening more. Infant transferred an additional 0.5 oz. For a total of 2.0 oz. This feed. Infant alert and content. Rooting at times and offered breast again several times, but not interested in nursing further. Discussed how infant probably still continues to swallow a lot of air while nursing contributing to him acting uncomfortable and not wanting to be put down.  Mom also states that infant does not like tummy time, but she continues to work on it throughout the day. Mom hopeful that further chiro treatments will be helpful with both latch improvement and increased tolerance during tummy time. Improved sustained latch would most likely decrease amount of air swallowed and fussiness that ensues. Mom plans return on Thursday 11-30-23 for further follow up.     LACTATION PROBLEMS AND STRATEGIES:  Problems latching baby to breast: Baby should latch to areola (dark area) not just the nipple.  Baby's lips should be flanged outward.  Bring the baby up to the level of your breast by putting a pillow under the baby.  Do not use bottles or pacifiers until latching on well.  Dribble milk over the nipple or express milk so that baby can taste it  Encourage a deeper latch with the asymetrical latch- lining baby's nose opposite mother's nipple.  Encourage nipple to stand up prior to feeing by pumping, nipple rolling or by brief application of ice.  Gently tickle your baby's lip with your nipple to encourage your baby to open his/her mouth wide.  Hold baby so that your breast is positioned deep in the baby's mouth.  Hold your baby close, tummy to tummy.  If baby does not latch to breast, express breast milk.  If engorged, express some milk to soften breast.  If the baby is not latched on well, break seal and gently try again.  Keep baby skin to skin and watch for feeding cues.  Massage breast to start milk-ejection reflex.  Place nipple and at least 1 inch of areola into baby's mouth.  Place your hand behind  the baby's neck and shoulder.  Do not force baby's head into breast.  Put baby in the football hold or clutch hold, supporting his/her neck and head in sniffing position to open his/her throat.  Shape breast into oval shape (sandwich hold)  for deep latch.  Try different feeding positions (cradle, football, side etc.).  Use a breast feeding pillow to bring baby up to the level of the breast.  Use nipple shield and monitor baby's weight gain and output.  Use semi-reclined feeding position to allow baby to take an active role and trigger baby's inborn feeding behavior.  Use your hand to support your breast during feeding.  When your baby's mouth is wide open, quickly pull baby into your breast.  Your baby's chin should be pressed into your breast.  PATIENT INSTRUCTION HANDOUTS GIVEN:      LACTATION EDUCATION:  Correct Positioning and Correct Latch On

## 2023-11-30 ENCOUNTER — LACTATION CONSULT (OUTPATIENT)
Dept: LACTATION | Facility: CLINIC | Age: 23
End: 2023-11-30
Payer: MEDICAID

## 2023-11-30 PROCEDURE — 99211 OFF/OP EST MAY X REQ PHY/QHP: CPT | Performed by: EMERGENCY MEDICINE

## 2023-11-30 NOTE — PATIENT INSTRUCTIONS
"Mom and baby Morrow here for continued follow up. Mom states nursing going well although vent regarding short sleeping periods during the day and wanting to \"eat all the time\". Reviewed possible growth spurt being cause of frequent feeds. Infant weighed prior to feed at 10 lbs. 0.2 oz. which is a gain of 3 ounces in 3 days. Mom independently latched infant to breast without difficulty. Infant continues to have loss of suction sound while nursing, but has now had second chiropractor appt. And has improved somewhat. Infant weighed after one side and transferred 2.5 oz per test weight! Latched and nursed in similar fashion to second breast. Infant active and \"handsy\" with much movement during feeding which was discussed as being age appropriate and shows that he is curious and learning about his world. Infant reweighed and transferred an additional 2.4 ounces on the second side. Total for feeding was 4.9 ounces! The most ever. Mom very pleased and feeling more confident. Plans return here Monday 12-4-23 for continued follow up.      "

## 2023-11-30 NOTE — PROGRESS NOTES
"Lactation Counseling Note    Subjective:    Darlene Moser is a 23 y.o. patient referred for lactation counseling. She is here today due to Latch issues. She was referred by her  self .     OB HISTORY:   /Para: : 1  Para: 1    Objective:    BREASTFEEDING ASSESSMENT:   OBGyn Exam  Breast Assessment: Large, Pendulous, Full, Compressible, and Readiness to feed  Nipple Assessment/Stage: intact, erect with stimulation, and rounded after feeding none  Areola: Normal  Feeding Position: baby - led, breast sandwich, c - hold, cross - cradle, laid back, skin to skin, both sides, nipple to nose, and mother demonstrates good positioning  Latch: eagerly grasped on to latch, areolar attachment, comfortable with no pain, sucking and swallowing, sucks with long jaw movement, chin and lower lip contact breast first, bursts of sucking, swallowing, and rest, chin moves in rhythmic motion, comfortable latch, and frequent audible swallows  Suck/Feeding: sustained, coordinated suck/swallow/breathe, audible swallowing, audible swallowing with stimulation, and content after feeding  Infant Behavior: awake, quiet alert, active alert, readiness to feed, feeding cues observed, rooting response, and content after feeding  Weight: Weight before feeding: 10 lb. 0.2 oz.   Weight after feeding: 10 lb. 5.1 oz.   Amount of breast milk transferred: 4.9 oz.  ml  Number of voids in the last 24 hours: 8  Number of stools in the last 24 hours: 5-6    PATIENT DISCUSSION SUMMARY: Mom and baby Altheimer here for continued follow up. Mom states nursing going well although vent regarding short sleeping periods during the day and wanting to \"eat all the time\". Reviewed possible growth spurt being cause of frequent feeds. Infant weighed prior to feed at 10 lbs. 0.2 oz. which is a gain of 3 ounces in 3 days. Mom independently latched infant to breast without difficulty. Infant continues to have loss of suction sound while nursing, but has now had " "second chiropractor appt. And has improved somewhat. Infant weighed after one side and transferred 2.5 oz per test weight! Latched and nursed in similar fashion to second breast. Infant active and \"handsy\" with much movement during feeding which was discussed as being age appropriate and shows that he is curious and learning about his world. Infant reweighed and transferred an additional 2.4 ounces on the second side. Total for feeding was 4.9 ounces! The most ever. Mom very pleased and feeling more confident. Plans return here Monday 12-4-23 for continued follow up.     LACTATION PROBLEMS AND STRATEGIES:     PATIENT INSTRUCTION HANDOUTS GIVEN:      LACTATION EDUCATION:  Correct Positioning and Correct Latch On                                                                                       "

## 2023-12-01 ENCOUNTER — LACTATION CONSULT (OUTPATIENT)
Dept: LACTATION | Facility: CLINIC | Age: 23
End: 2023-12-01
Payer: MEDICAID

## 2023-12-01 PROCEDURE — 99211 OFF/OP EST MAY X REQ PHY/QHP: CPT | Performed by: EMERGENCY MEDICINE

## 2023-12-01 NOTE — PROGRESS NOTES
"Lactation Counseling Note    Subjective:    Darlene Moser is a 23 y.o. patient referred for lactation counseling. She is here today due to Latch issues. She was referred by her  self .     OB HISTORY:   /Para: : 1  Para: 1    Objective:    BREASTFEEDING ASSESSMENT:   OBGyn Exam  Breast Assessment: Large, Pendulous, Full, Compressible, and Readiness to feed  Nipple Assessment/Stage: intact, erect, and rounded after feeding none  Areola: Normal  Feeding Position: baby - led, c - hold, cross - cradle, laid back, skin to skin, both sides, nipple to nose, and mother demonstrates good positioning  Latch: minimal assistance is needed, eagerly grasped on to latch, deep latch obtained, comfortable with no pain, sucking and swallowing, sucks with long jaw movement, chin and lower lip contact breast first, bursts of sucking, swallowing, and rest, upper lip turned in, chin moves in rhythmic motion, comfortable latch, and frequent audible swallows  Suck/Feeding: sustained, audible swallowing, and content after feeding  Infant Behavior: awake, quiet alert, readiness to feed, feeding cues observed, rooting response, and content after feeding  Weight: Weight before feeding: 10 lb 1.2 oz  Weight after feeding: 10 lb 3.5 oz.  Amount of breast milk transferred: 2.2 oz.  ml    PATIENT DISCUSSION SUMMARY: Mom, Dad and baby here for continued follow up. Infant weighed before feed at 10 lb. 1.2 oz. Which is a gain of 1 oz. In 1 day. Mom independently nursed infant on both breasts for a total transfer of 2.3 oz. Infant active and curious during feeding as he has been and continues to lose suction throughout feed, \"noisy\" eater, but has shown some improvement since having several chiropractor appts. Patient had questions regarding safe sleep and smoking around infant which were discussed according to current recommendations. Mom plans follow up 23.     LACTATION PROBLEMS AND STRATEGIES:  Problems latching baby to " breast: Baby should latch to areola (dark area) not just the nipple.  Baby's lips should be flanged outward.  Bring the baby up to the level of your breast by putting a pillow under the baby.  Do not use bottles or pacifiers until latching on well.  Dribble milk over the nipple or express milk so that baby can taste it  Encourage a deeper latch with the asymetrical latch- lining baby's nose opposite mother's nipple.  Encourage nipple to stand up prior to feeing by pumping, nipple rolling or by brief application of ice.  Gently tickle your baby's lip with your nipple to encourage your baby to open his/her mouth wide.  Hold baby so that your breast is positioned deep in the baby's mouth.  Hold your baby close, tummy to tummy.  If baby does not latch to breast, express breast milk.  If engorged, express some milk to soften breast.  If the baby is not latched on well, break seal and gently try again.  Keep baby skin to skin and watch for feeding cues.  Massage breast to start milk-ejection reflex.  Place nipple and at least 1 inch of areola into baby's mouth.  Place your hand behind the baby's neck and shoulder.  Do not force baby's head into breast.  Put baby in the football hold or clutch hold, supporting his/her neck and head in sniffing position to open his/her throat.  Shape breast into oval shape (sandwich hold)  for deep latch.  Try different feeding positions (cradle, football, side etc.).  Use a breast feeding pillow to bring baby up to the level of the breast.  Use nipple shield and monitor baby's weight gain and output.  Use semi-reclined feeding position to allow baby to take an active role and trigger baby's inborn feeding behavior.  Use your hand to support your breast during feeding.  When your baby's mouth is wide open, quickly pull baby into your breast.  Your baby's chin should be pressed into your breast.  PATIENT INSTRUCTION HANDOUTS GIVEN:      LACTATION EDUCATION:  Correct Positioning and Correct  Latch On

## 2023-12-04 ENCOUNTER — LACTATION CONSULT (OUTPATIENT)
Dept: LACTATION | Facility: CLINIC | Age: 23
End: 2023-12-04
Payer: MEDICAID

## 2023-12-04 PROCEDURE — 99211 OFF/OP EST MAY X REQ PHY/QHP: CPT | Mod: 25 | Performed by: EMERGENCY MEDICINE

## 2023-12-04 NOTE — PATIENT INSTRUCTIONS
"Mom and baby here for continued weight follow up and latch assessment. Infant weighed prior to feed at 10 lb. 4.8 oz which is a gain of 3.6 oz. In 3 days. Mom independently latched infant to both sides with total transfer of 3.9 ounces. Noted decrease in amount of \"loss of suction\" noise while nursing. Mom would like to come back Friday 12-8-23 for weight check. Mom states infant is sleeping slightly better and giving longer stretch in the morning once for a nap.    "

## 2023-12-04 NOTE — PROGRESS NOTES
"Lactation Counseling Note    Subjective:    Darlene Moser is a 23 y.o. patient referred for lactation counseling. She is here today due to Latch issues. She was referred by her  self .     OB HISTORY:   /Para: : 1  Para: 1    Objective:    BREASTFEEDING ASSESSMENT:   Physical Exam    Breast Assessment: Large, Pendulous, Full, Compressible, and Readiness to feed  Nipple Assessment/Stage: intact, erect, and rounded after feeding none  Areola: Normal  Feeding Position: baby - led, breast sandwich, cross - cradle, laid back, skin to skin, both sides, nipple to nose, and mother demonstrates good positioning  Latch: eagerly grasped on to latch, deep latch obtained, comfortable with no pain, sucking and swallowing, sucks with long jaw movement, chin and lower lip contact breast first, chin moves in rhythmic motion, and frequent audible swallows  Suck/Feeding: sustained, coordinated suck/swallow/breathe, audible swallowing, and content after feeding  Infant Behavior: awake, quiet alert, readiness to feed, feeding cues observed, rooting response, and content after feeding  Weight: Weight before feeding: 10 lb. 4.8 oz.   Weight after feeding: 10 lb. 8.7 oz.   Amount of breast milk transferred: 3.9 oz. ml  Number of voids in the last 24 hours: 8  Number of stools in the last 24 hours: 5-6    PATIENT DISCUSSION SUMMARY: Mom and baby here for continued weight follow up and latch assessment. Infant weighed prior to feed at 10 lb. 4.8 oz which is a gain of 3.6 oz. In 3 days. Mom independently latched infant to both sides with total transfer of 3.9 ounces. Noted decrease in amount of \"loss of suction\" noise while nursing. Mom would like to come back 23 for weight check. Mom states infant is sleeping slightly better and giving longer stretch in the morning once for a nap.     LACTATION PROBLEMS AND STRATEGIES:  Problems latching baby to breast: Baby should latch to areola (dark area) not just the " nipple.  Baby's lips should be flanged outward.  Bring the baby up to the level of your breast by putting a pillow under the baby.  Do not use bottles or pacifiers until latching on well.  Dribble milk over the nipple or express milk so that baby can taste it  Encourage a deeper latch with the asymetrical latch- lining baby's nose opposite mother's nipple.  Encourage nipple to stand up prior to feeing by pumping, nipple rolling or by brief application of ice.  Gently tickle your baby's lip with your nipple to encourage your baby to open his/her mouth wide.  Hold baby so that your breast is positioned deep in the baby's mouth.  Hold your baby close, tummy to tummy.  If baby does not latch to breast, express breast milk.  If engorged, express some milk to soften breast.  If the baby is not latched on well, break seal and gently try again.  Keep baby skin to skin and watch for feeding cues.  Massage breast to start milk-ejection reflex.  Place nipple and at least 1 inch of areola into baby's mouth.  Place your hand behind the baby's neck and shoulder.  Do not force baby's head into breast.  Put baby in the football hold or clutch hold, supporting his/her neck and head in sniffing position to open his/her throat.  Shape breast into oval shape (sandwich hold)  for deep latch.  Try different feeding positions (cradle, football, side etc.).  Use a breast feeding pillow to bring baby up to the level of the breast.  Use nipple shield and monitor baby's weight gain and output.  Use semi-reclined feeding position to allow baby to take an active role and trigger baby's inborn feeding behavior.  Use your hand to support your breast during feeding.  When your baby's mouth is wide open, quickly pull baby into your breast.  Your baby's chin should be pressed into your breast.  PATIENT INSTRUCTION HANDOUTS GIVEN:      LACTATION EDUCATION:  Correct Positioning and Correct Latch On

## 2023-12-08 ENCOUNTER — LACTATION CONSULT (OUTPATIENT)
Dept: LACTATION | Facility: CLINIC | Age: 23
End: 2023-12-08
Payer: MEDICAID

## 2023-12-08 PROCEDURE — 99211 OFF/OP EST MAY X REQ PHY/QHP: CPT | Mod: 25 | Performed by: EMERGENCY MEDICINE

## 2023-12-08 NOTE — PATIENT INSTRUCTIONS
Mom here for follow up weight check.  Baby up 4oz in 4 days.  Mom still c/o soreness on right side.  Moms breast very full rev rps before latching mom states that was improvedbaby not want to stay latch on right baby appeared not to want to feed .  After weighing found that baby had trasfered 3 oz already mom to follow up on monday12/11/23

## 2023-12-08 NOTE — PROGRESS NOTES
Lactation Counseling Note    Subjective:    Darlene Moser is a 23 y.o. patient referred for lactation counseling. She is here today due to  weight check . She was referred by her  self .     OB HISTORY:   Mode of Delivery: Normal vaginal route    Objective:    BREASTFEEDING ASSESSMENT:   Physical Exam    Breast Assessment: Medium, Symmetrical, Full, Firm, Warm, and Compressible  Nipple Assessment/Stage: intact, erect, rounded after feeding, and sore Stage I - Pain or irritation with no skin breakdown  Areola: Normal  Feeding Position: baby - led, cradle, skin to skin, both sides, and mother demonstrates good positioning  Latch: minimal assistance is needed, latch achieved, latch achieved after repeated attempts, comfortable with no pain, flanged lips, correct tongue position, and frequent audible swallows  Suck/Feeding: sustained  Infant Feeding Method: Breast milk only  Infant Behavior: awake, quiet alert, readiness to feed, feeding cues observed, and content after feeding  Infant Information: Post status frenotomy  Poor occlusion of lips around areola  Good cupping of tongue  Good lateral movement of the tongue  Able to elevate tongue to roof of mouth  Nipple Pain: Pain scale 0-10 (10 most pain): 2 on right   After adjustment pain 0-10 (10 most pain): baby didn't want to feed transferred well   Weight: Weight before feeding: 10lbs 8.9oz  Weight after feeding: [id number]  Amount of breast milk transferred: 3oz ml    PATIENT DISCUSSION SUMMARY:  Mom here for follow up weight check.  Baby up 4oz in 4 days.  Mom still c/o soreness on right side.  Moms breast very full rev rps before latching mom states that was improvedbaby not want to stay latch on right baby appeared not to want to feed .  After weighing found that baby had trasfered 3 oz already mom to follow up on monday12/11/23  LACTATION PROBLEMS AND STRATEGIES:     PATIENT INSTRUCTION HANDOUTS GIVEN:      LACTATION EDUCATION:  Waking Techniques, Correct  Positioning, and Correct Latch On

## 2023-12-11 ENCOUNTER — CLINICAL SUPPORT (OUTPATIENT)
Dept: LACTATION | Facility: CLINIC | Age: 23
End: 2023-12-11
Payer: MEDICAID

## 2023-12-11 PROCEDURE — 99211 OFF/OP EST MAY X REQ PHY/QHP: CPT | Mod: 25 | Performed by: EMERGENCY MEDICINE

## 2023-12-11 NOTE — PROGRESS NOTES
Lactation Counseling Note    Subjective:    Darlene Moser is a 23 y.o. patient referred for lactation counseling. She is here today due to  weight check and persistant pain . She was referred by her  self .     OB HISTORY:   Mode of Delivery: Normal vaginal route    Objective:    BREASTFEEDING ASSESSMENT:   Physical Exam    Breast Assessment: Medium, Symmetrical, Full, Soft, Warm, Compressible, Plugged duct(s), and Readiness to feed  Nipple Assessment/Stage: sore Stage I - Pain or irritation with no skin breakdown  Areola: Normal  Feeding Position: baby - led, breast sandwich, c - hold, cross - cradle, laid back, skin to skin, both sides, switch nursing, and nipple to nose  Latch: reluctant, minimal assistance is needed, instructed on deep latch, cries while latching, sucking and swallowing, chin and lower lip contact breast first, bursts of sucking, swallowing, and rest, correct tongue position, nipple - slurping/pulls/nibbles, and nipple slides back and forth within baby's mouth  Suck/Feeding: unsustained, audible swallowing, and content after feeding  Feeding and Cleaning instructions reviewed for:    Infant Feeding Method: Breast milk only  Infant Behavior: awake, fussy, readiness to feed, feeding cues observed, and content after feeding  Nipple Pain: Pain scale 0-10 (10 most pain): 5  Weight: Weight before feeding: 10lb 10.4oz  Weight after feeding: 10lb 11.4oz  Amount of breast milk transferred: 1oz ml    PATIENT DISCUSSION SUMMARY:  Mom here to day with continued painful rt nipple states that it turns white and has a dull ache most of the time.  Symptoms of a vasospasm.  Encouraged to provide warmth with flannel nursing pads and to perhaps only pump on that side due to painmom denies fever chills or headache.  Baby with good weight gain but poor transfer ttoday.  Mom to follow up on Wednesday 12/13/23  LACTATION PROBLEMS AND STRATEGIES:     PATIENT INSTRUCTION HANDOUTS GIVEN:      LACTATION  EDUCATION:  Correct Positioning, Correct Latch On, and Demo use of Pump

## 2023-12-11 NOTE — PATIENT INSTRUCTIONS
Mom here to day with continued painful rt nipple states that it turns white and has a dull ache most of the time.  Symptoms of a vasospasm.  Encouraged to provide warmth with flannel nursing pads and to perhaps only pump on that side due to painmom denies fever chills or headache.  Baby with good weight gain but poor transfer florecita.  Mom to follow up on Wednesday 12/13/23

## 2024-03-06 ENCOUNTER — OFFICE VISIT (OUTPATIENT)
Dept: OPHTHALMOLOGY | Facility: CLINIC | Age: 24
End: 2024-03-06
Payer: MEDICAID

## 2024-03-06 DIAGNOSIS — H52.12 MYOPIA OF LEFT EYE: Primary | ICD-10-CM

## 2024-03-06 DIAGNOSIS — H52.222 REGULAR ASTIGMATISM OF LEFT EYE: ICD-10-CM

## 2024-03-06 DIAGNOSIS — Z98.890 HISTORY OF LASER ASSISTED IN SITU KERATOMILEUSIS: ICD-10-CM

## 2024-03-06 PROCEDURE — 92015 DETERMINE REFRACTIVE STATE: CPT | Performed by: OPTOMETRIST

## 2024-03-06 PROCEDURE — 92004 COMPRE OPH EXAM NEW PT 1/>: CPT | Performed by: OPTOMETRIST

## 2024-03-06 ASSESSMENT — TONOMETRY
IOP_METHOD: GOLDMANN APPLANATION
OS_IOP_MMHG: 16
OD_IOP_MMHG: 16

## 2024-03-06 ASSESSMENT — REFRACTION
OS_AXIS: 105
OD_SPHERE: PLANO
OS_CYLINDER: -0.75
OS_SPHERE: -0.50

## 2024-03-06 ASSESSMENT — ENCOUNTER SYMPTOMS
ALLERGIC/IMMUNOLOGIC NEGATIVE: 0
MUSCULOSKELETAL NEGATIVE: 0
ENDOCRINE NEGATIVE: 0
RESPIRATORY NEGATIVE: 0
HEMATOLOGIC/LYMPHATIC NEGATIVE: 0
CARDIOVASCULAR NEGATIVE: 0
GASTROINTESTINAL NEGATIVE: 0
CONSTITUTIONAL NEGATIVE: 0
PSYCHIATRIC NEGATIVE: 0
NEUROLOGICAL NEGATIVE: 0
EYES NEGATIVE: 0

## 2024-03-06 ASSESSMENT — EXTERNAL EXAM - LEFT EYE: OS_EXAM: NORMAL

## 2024-03-06 ASSESSMENT — REFRACTION_MANIFEST
OS_AXIS: 105
OS_CYLINDER: -0.75
OS_SPHERE: -0.50
OD_SPHERE: PLANO

## 2024-03-06 ASSESSMENT — VISUAL ACUITY
OS_SC: 20/30-2
METHOD: SNELLEN - LINEAR
OD_SC: 20/20
OS_SC: J1+
OD_SC: J1+

## 2024-03-06 ASSESSMENT — CONF VISUAL FIELD
OD_INFERIOR_TEMPORAL_RESTRICTION: 0
OD_NORMAL: 1
METHOD: COUNTING FINGERS
OS_INFERIOR_TEMPORAL_RESTRICTION: 0
OD_SUPERIOR_NASAL_RESTRICTION: 0
OD_INFERIOR_NASAL_RESTRICTION: 0
OS_NORMAL: 1
OD_SUPERIOR_TEMPORAL_RESTRICTION: 0
OS_SUPERIOR_TEMPORAL_RESTRICTION: 0
OS_SUPERIOR_NASAL_RESTRICTION: 0
OS_INFERIOR_NASAL_RESTRICTION: 0

## 2024-03-06 ASSESSMENT — SLIT LAMP EXAM - LIDS
COMMENTS: NORMAL
COMMENTS: NORMAL

## 2024-03-06 ASSESSMENT — CUP TO DISC RATIO
OD_RATIO: 0.3
OS_RATIO: 0.3

## 2024-03-06 ASSESSMENT — EXTERNAL EXAM - RIGHT EYE: OD_EXAM: NORMAL

## 2024-03-06 NOTE — PROGRESS NOTES
Assessment/Plan   Diagnoses and all orders for this visit:  Myopia of left eye  Regular astigmatism of left eye  History of laser assisted in situ keratomileusis  New spec rx released today per patient request. Ocular health wnl for age OU. Monitor 1 year or sooner prn. Refraction billed today.  LASIK paperwork filled out for possible re-treatment.   Can RTC for CLs prn.

## 2024-10-21 ENCOUNTER — OFFICE VISIT (OUTPATIENT)
Dept: URGENT CARE | Age: 24
End: 2024-10-21
Payer: MEDICAID

## 2024-10-21 VITALS
WEIGHT: 125 LBS | HEART RATE: 77 BPM | RESPIRATION RATE: 16 BRPM | OXYGEN SATURATION: 100 % | HEIGHT: 62 IN | SYSTOLIC BLOOD PRESSURE: 107 MMHG | TEMPERATURE: 98 F | BODY MASS INDEX: 23 KG/M2 | DIASTOLIC BLOOD PRESSURE: 70 MMHG

## 2024-10-21 DIAGNOSIS — J02.9 SORE THROAT: ICD-10-CM

## 2024-10-21 DIAGNOSIS — J06.9 VIRAL URI: Primary | ICD-10-CM

## 2024-10-21 LAB — POC RAPID STREP: NEGATIVE

## 2024-10-21 PROCEDURE — 87880 STREP A ASSAY W/OPTIC: CPT | Performed by: FAMILY MEDICINE

## 2024-10-21 PROCEDURE — 99213 OFFICE O/P EST LOW 20 MIN: CPT | Performed by: FAMILY MEDICINE

## 2024-10-21 NOTE — PATIENT INSTRUCTIONS
Strep test was negative.    You have an upper respiratory infection. Mostly, these are caused by viruses and treatment is as follows. Symptoms may last for up to 1-2 weeks, but follow up with PCP if your symptoms start worsening.  For muscle aches, fever, chills: Acetaminophen or Ibuprofen, Advil, or Aleve can be used.  Hydration: Maintain adequate hydration with water.  Nasal symptoms: Nasal Saline available over-the-counter, can be used 3-4 times per day  Decongestants reduce nasal congestion and discharge  Vaseline at opening of nares may reduce irritation   Cool Mist Humidifier may loosens discharge  Cough Suppressants or expectorants may be taken over-the-counter    Antibiotics are not indicated for treatment, as antibiotics do not treat viruses.

## 2024-10-21 NOTE — PROGRESS NOTES
"Subjective   Patient ID: Darlene Moser is a 23 y.o. female. They present today with a chief complaint of Sore Throat.    Patient disposition: Home    History of Present Illness  HPI  Sore throat for for the past 2 days.  Patient is a .  No fever or chills.  No medication taken.  Currently breast-feeding.  No seasonal allergies.  Past Medical History  Allergies as of 10/21/2024 - Reviewed 10/21/2024   Allergen Reaction Noted    Amoxicillin Other 07/14/2023    Gluten GI Upset 05/18/2022    Milk GI Upset 05/18/2022       (Not in a hospital admission)            reports that she has never smoked. She has never used smokeless tobacco. She reports that she does not currently use alcohol. She reports that she does not use drugs.    Review of Systems  As noted in HPI. ROS otherwise negative unless noted.       Objective    Vitals:    10/21/24 1713   BP: 107/70   Pulse: 77   Resp: 16   Temp: 36.7 °C (98 °F)   SpO2: 100%   Weight: 56.7 kg (125 lb)   Height: 1.575 m (5' 2\")     No LMP recorded.    Physical Exam  Constitutional: vital signs reviewed. Well developed, well nourished. patient alert and patient without distress.   Head and Face: Normal and atraumatic.    Ears, Nose, Mouth, and Throat:   Hearing: Normal.  External inspection of nose: Normal.   Lips, teeth, tongue and gums: Normal and well hydrated. External inspection of ears: Normal. Ear canals and TMs: Normal.  Posterior pharynx moist, no exudate, symmetric, no abscess, and with mild injection.  Neck: No neck mass was observed. Supple. normal muscle tone.   Cardiovascular: Heart rate normal, normal S1 and S2, no gallops, no murmurs and no pericardial rub. Rhythm: Normal.  Pulmonary: No respiratory distress. Palpation of chest: Normal. Clear bilateral breath sounds.   Lymphatic: No cervical lymphadenopathy  Psych: Normal mood and affect        Procedures    Point of Care Test & Imaging Results from this visit  Results for orders placed or " performed in visit on 10/21/24   POCT rapid strep A manually resulted    Collection Time: 10/21/24  5:14 PM   Result Value Ref Range    POC Rapid Strep Negative Negative          Diagnostic study results (if any) were reviewed.    Assessment/Plan   Allergies, medications, history, and pertinent labs/EKGs/Imaging reviewed.    Medical Decision Making  See note    Orders and Diagnoses  Diagnoses and all orders for this visit:  Sore throat  -     POCT rapid strep A manually resulted      Medical Admin Record      Follow Up Instructions  No follow-ups on file.        Electronically signed by [facility]

## 2024-10-21 NOTE — LETTER
October 21, 2024     Patient: Darlene Moser   YOB: 2000   Date of Visit: 10/21/2024       To Whom It May Concern:    Darlene Moser was seen in my clinic on 10/21/2024 at 4:15 pm. Please excuse Darlene for her absence from work on this day to make the appointment.    If you have any questions or concerns, please don't hesitate to call.         Sincerely,         [facility]        CC: No Recipients

## 2024-11-28 ENCOUNTER — OFFICE VISIT (OUTPATIENT)
Dept: URGENT CARE | Age: 24
End: 2024-11-28
Payer: MEDICAID

## 2024-11-28 VITALS
HEART RATE: 101 BPM | DIASTOLIC BLOOD PRESSURE: 55 MMHG | OXYGEN SATURATION: 99 % | RESPIRATION RATE: 16 BRPM | BODY MASS INDEX: 23 KG/M2 | HEIGHT: 62 IN | SYSTOLIC BLOOD PRESSURE: 121 MMHG | TEMPERATURE: 98.7 F | WEIGHT: 125 LBS

## 2024-11-28 DIAGNOSIS — R52 GENERALIZED BODY ACHES: Primary | ICD-10-CM

## 2024-11-28 DIAGNOSIS — J02.9 SORE THROAT: ICD-10-CM

## 2024-11-28 LAB
POC RAPID INFLUENZA A: NEGATIVE
POC RAPID INFLUENZA B: NEGATIVE
POC RAPID MONO: NEGATIVE
POC RAPID STREP: NEGATIVE

## 2024-11-28 PROCEDURE — 87651 STREP A DNA AMP PROBE: CPT

## 2024-11-28 RX ORDER — AZITHROMYCIN 250 MG/1
TABLET, FILM COATED ORAL
Qty: 6 TABLET | Refills: 0 | Status: SHIPPED | OUTPATIENT
Start: 2024-11-28

## 2024-11-28 ASSESSMENT — PAIN SCALES - GENERAL: PAINLEVEL_OUTOF10: 6

## 2024-11-28 NOTE — PROGRESS NOTES
"Subjective   Patient ID: Darlene Moser is a 24 y.o. female. They present today with a chief complaint of Sore Throat (X two days) and Generalized Body Aches.    History of Present Illness  Darlene is a 24 year old female who presents to the urgent care for evaluation of 2 days of sore throat and generalized body aches.  Patient describes sore throat with tonsillar swelling and white patches and is seeking evaluation reassurance and treatment options.  Patient denies chest pain, shortness of breath or recorded fever.  Patient denies a cough.  Patient has concerns for possible strep infection and states she is breast-feeding currently and would like this ruled out.  No other symptoms or concerns otherwise reported.    **Patient admits to baseline rash on the neck likely from irritation however denies throat tightening or concern for allergic reaction.  Patient states rash has been there for some time and feels unrelated to her current symptoms.    Past Medical History  Allergies as of 11/28/2024 - Reviewed 11/28/2024   Allergen Reaction Noted    Amoxicillin Other 07/14/2023    Gluten GI Upset 05/18/2022    Milk GI Upset 05/18/2022       (Not in a hospital admission)       Past Medical History:   Diagnosis Date    Myopia of both eyes        Past Surgical History:   Procedure Laterality Date    LASIK          reports that she has never smoked. She has never used smokeless tobacco. She reports that she does not currently use alcohol. She reports that she does not use drugs.    Review of Systems  A 10-point review of systems was performed, otherwise unremarkable unless stated in the history of present illness.              Objective    Vitals:    11/28/24 0954   BP: 121/55   BP Location: Left arm   Patient Position: Sitting   BP Cuff Size: Adult   Pulse: 101   Resp: 16   Temp: 37.1 °C (98.7 °F)   TempSrc: Oral   SpO2: 99%   Weight: 56.7 kg (125 lb)   Height: 1.575 m (5' 2\")     No LMP recorded.    Gen: Vitals noted and " reviewed, no evidence of acute distress, well developed and afebrile.   Head/Face: Atraumatic and normocephalic.   ENT: TMs clear bilaterally, EACs unremarkable. Mastoids non-tender. Posterior oropharynx with erythema, exudates, and 1+ b/l tonsillar swelling. Uvula is in the midline and non-edematous.   Neck: Supple. No meningismus through full range of motion. +b/l anterior cervical lymphadenopathy.   Cardiac: Regular rate and rhythm no murmur.   Lungs: Clear to auscultation throughout, No evidence of wheezing, rhonchi or crackles. Good aeration throughout.   Skin: Diffuse erythematous wheals around the anterior nape of neck without vesicles or pustules noted.  No palmar rash noted.  No maculopapular rash noted throughout.  Without evidence of ecchymosis, petechiae or wounds.  Psych: Mood and affect appropriate for setting.       Point of Care Test & Imaging Results from this visit  Results for orders placed or performed in visit on 11/28/24   POCT Influenza A/B manually resulted   Result Value Ref Range    POC Rapid Influenza A Negative Negative    POC Rapid Influenza B Negative Negative   POCT rapid strep A manually resulted   Result Value Ref Range    POC Rapid Strep Negative Negative      No results found.    Diagnostic study results (if any) were reviewed by Rajat Langston DO.    Assessment/Plan   Allergies, medications, history, and pertinent labs/EKGs/Imaging reviewed by Rajat Langston DO.     Medical Decision Making  Discussed with the patient symptoms and clinical presentation findings suggestive of an acute pharyngitis likely secondary viral infection of unclear etiology.  However given the patient's clinical presentation with negative mono testing in office today we did discuss antimicrobial coverage and agreed to prescribe azithromycin.  We sent strep for PCR testing and confirmation and advised patient could hold off on treatment until these results are confirmed.  In the meantime we did provide  information on safety profile for macrolides while breast-feeding as the patient is allergic to penicillins. Follow up with PCP. We advised seeking immediate emergency medical attention if symptoms fail to improve, worsen or any concerning symptoms arise. Patient voiced full understanding and agreement to plan.      Orders and Diagnoses  Diagnoses and all orders for this visit:  Sore throat  -     POCT Influenza A/B manually resulted  -     POCT rapid strep A manually resulted      Medical Admin Record      Patient disposition: Home    Electronically signed by Rajat Langston DO  10:15 AM

## 2024-11-28 NOTE — PATIENT INSTRUCTIONS
Follow up with PCP. We advised seeking immediate emergency medical attention if symptoms fail to improve, worsen or any concerning symptoms arise. Patient voiced full understanding and agreement to plan.    Macrolides are generally considered safe to use while breastfeeding, but there are some things to keep in mind:  Infant monitoring    While macrolides are generally safe, you should monitor your infant for any adverse reactions.   Treatment choice    When choosing a macrolide, you should consider the clinical indications, national and local antimicrobial policy, and how suitable it is for breastfeeding.     Drug interactions  Macrolides can interact with certain medications, so you should tell your healthcare provider about all medications and supplements you are taking.     Infantile hypertrophic pyloric stenosis  Some studies have suggested that exposure to macrolides through breast milk may be associated with infantile hypertrophic pyloric stenosis. This condition can cause projectile vomiting, dehydration, electrolyte abnormalities, and in rare cases, death.   Erythromycin is generally considered acceptable to use while breastfeeding. Azithromycin passes into breast milk in small amounts and has not been known to cause any side effects in  babies.

## 2024-11-29 LAB — S PYO DNA THROAT QL NAA+PROBE: NOT DETECTED

## 2025-01-01 ENCOUNTER — OFFICE VISIT (OUTPATIENT)
Dept: URGENT CARE | Age: 25
End: 2025-01-01
Payer: MEDICAID

## 2025-01-01 VITALS
TEMPERATURE: 97.8 F | WEIGHT: 125 LBS | HEART RATE: 81 BPM | OXYGEN SATURATION: 100 % | HEIGHT: 62 IN | SYSTOLIC BLOOD PRESSURE: 91 MMHG | DIASTOLIC BLOOD PRESSURE: 66 MMHG | BODY MASS INDEX: 23 KG/M2 | RESPIRATION RATE: 20 BRPM

## 2025-01-01 DIAGNOSIS — L30.9 DERMATITIS: ICD-10-CM

## 2025-01-01 DIAGNOSIS — Z48.02 ENCOUNTER FOR REMOVAL OF SUTURES: Primary | ICD-10-CM

## 2025-01-01 PROCEDURE — 1036F TOBACCO NON-USER: CPT | Performed by: STUDENT IN AN ORGANIZED HEALTH CARE EDUCATION/TRAINING PROGRAM

## 2025-01-01 PROCEDURE — 3008F BODY MASS INDEX DOCD: CPT | Performed by: STUDENT IN AN ORGANIZED HEALTH CARE EDUCATION/TRAINING PROGRAM

## 2025-01-01 PROCEDURE — 99213 OFFICE O/P EST LOW 20 MIN: CPT | Performed by: STUDENT IN AN ORGANIZED HEALTH CARE EDUCATION/TRAINING PROGRAM

## 2025-01-01 RX ORDER — MUPIROCIN 20 MG/G
OINTMENT TOPICAL
Qty: 22 G | Refills: 0 | Status: SHIPPED | OUTPATIENT
Start: 2025-01-01 | End: 2025-01-08

## 2025-01-01 NOTE — PROGRESS NOTES
"Subjective   Patient ID: Darlene Moser is a 24 y.o. female. They present today with a chief complaint of Suture / Staple Removal.    History of Present Illness  Darlene is a 24-year-old female who presents to the urgent care for suture removal after having a procedure done over the left anterior side of chest on 12/26/2024.  Patient states she had a skin biopsy performed and has a follow-up appointment on 3 January however states she has too much irritation with stitches and was tried to remove them at home however is here today to get them removed.  Patient denies any fever, other injuries, systemic symptoms or drainage or concern for infection.  Patient states she has her follow-up scheduled for the third and would like sutures removed prior to that.  No other symptoms or concerns otherwise reported.    Past Medical History  Allergies as of 01/01/2025 - Reviewed 01/01/2025   Allergen Reaction Noted    Amoxicillin Other 07/14/2023    Gluten GI Upset 05/18/2022    Milk GI Upset 05/18/2022       (Not in a hospital admission)       Past Medical History:   Diagnosis Date    Myopia of both eyes        Past Surgical History:   Procedure Laterality Date    LASIK          reports that she has never smoked. She has never used smokeless tobacco. She reports that she does not currently use alcohol. She reports that she does not use drugs.    Review of Systems  A 10-point review of systems was performed, otherwise unremarkable unless stated in the history of present illness.                Objective    Vitals:    01/01/25 1010   BP: 91/66   BP Location: Left arm   Patient Position: Sitting   BP Cuff Size: Adult   Pulse: 81   Resp: 20   Temp: 36.6 °C (97.8 °F)   TempSrc: Oral   SpO2: 100%   Weight: 56.7 kg (125 lb)   Height: 1.575 m (5' 2\")     Patient's last menstrual period was 12/30/2024.    Gen: Vitals noted and reviewed, no evidence of acute distress, well developed and afebrile.   Psych: Mood and affect appropriate for " setting.  Head/Face: Atraumatic and normocephalic.   Neuro: No focal deficits noted.  Mouth/Throat: No evidence of edema, erythema or lesions.  Posterior oropharynx without erythema, exudate, or swelling. Uvula is in the midline and non-edematous.   Neck: Supple. No meningismus through full range of motion. No lymphadenopathy.   Cardiac: Regular rate and rhythm no murmur.   Lungs: Clear to auscultation throughout, No evidence of wheezing, rhonchi or crackles. Good aeration throughout.   Extremities: Symmetrical, No peripheral edema  Skin: Erythematous patch surrounding well-healed and well-approximated wound with 2 interrupted sutures centrally.  No induration, fluctuance, masses, crepitus or vesicles or pustules noted.  No petechiae noted.  No active drainage or bleeding noted.  Without evidence of ecchymosis, open wounds.      Point of Care Test & Imaging Results from this visit  No results found for this visit on 01/01/25.   No results found.    Diagnostic study results (if any) were reviewed by Rajat Langston DO.    Assessment/Plan   Patient verbally consented to removal of 2 sutures from the anterior lower neck region.  2 interrupted sutures were removed without complications.  The procedure was tolerated well.  No blood loss.  Topical bacitracin was applied.      Allergies, medications, history, and pertinent labs/EKGs/Imaging reviewed by Rajat Langston DO.     Medical Decision Making  Discussed with the patient symptoms and clinical presentation findings suggestive of dermatitis with healing wound without signs of active infection.  We did advise patient to closely follow-up with her already established care provider who performed the procedure for evaluation definitive management.  In the meantime we advised if fever develops, discharge or worsening of rash to seek immediate emergent medical attention.  We did agree to provide a prescription for mupirocin topically for prophylaxis in the meantime. Follow up  with PCP. We advised seeking immediate emergency medical attention if symptoms fail to improve, worsen or any concerning symptoms arise. Patient voiced full understanding and agreement to plan.      Orders and Diagnoses  Diagnoses and all orders for this visit:  Encounter for removal of sutures  -     mupirocin (Bactroban) 2 % ointment; Apply topically 3 times a day for 7 days. Thin film to affected wound of skin  Dermatitis      Medical Admin Record      Patient disposition: Home    Electronically signed by Rajat Langston DO  10:26 AM

## 2025-08-26 ENCOUNTER — APPOINTMENT (OUTPATIENT)
Dept: URGENT CARE | Age: 25
End: 2025-08-26
Payer: MEDICAID

## 2025-08-26 ENCOUNTER — OFFICE VISIT (OUTPATIENT)
Dept: URGENT CARE | Age: 25
End: 2025-08-26
Payer: MEDICAID

## 2025-08-26 VITALS
WEIGHT: 135 LBS | TEMPERATURE: 98.2 F | DIASTOLIC BLOOD PRESSURE: 60 MMHG | HEIGHT: 62 IN | HEART RATE: 87 BPM | BODY MASS INDEX: 24.84 KG/M2 | SYSTOLIC BLOOD PRESSURE: 100 MMHG | OXYGEN SATURATION: 99 % | RESPIRATION RATE: 18 BRPM

## 2025-08-26 DIAGNOSIS — J02.9 SORE THROAT: ICD-10-CM

## 2025-08-26 DIAGNOSIS — J02.0 STREP PHARYNGITIS: Primary | ICD-10-CM

## 2025-08-26 LAB
POC HUMAN RHINOVIRUS PCR: NEGATIVE
POC INFLUENZA A VIRUS PCR: NEGATIVE
POC INFLUENZA B VIRUS PCR: NEGATIVE
POC RESPIRATORY SYNCYTIAL VIRUS PCR: NEGATIVE
POC STREPTOCOCCUS PYOGENES (GROUP A STREP) PCR: POSITIVE

## 2025-08-26 RX ORDER — AZITHROMYCIN 250 MG/1
TABLET, FILM COATED ORAL
Qty: 6 TABLET | Refills: 0 | Status: SHIPPED | OUTPATIENT
Start: 2025-08-26 | End: 2025-08-31

## 2025-08-26 ASSESSMENT — PAIN SCALES - GENERAL: PAINLEVEL_OUTOF10: 7
